# Patient Record
Sex: FEMALE | Race: WHITE | NOT HISPANIC OR LATINO | Employment: FULL TIME | URBAN - METROPOLITAN AREA
[De-identification: names, ages, dates, MRNs, and addresses within clinical notes are randomized per-mention and may not be internally consistent; named-entity substitution may affect disease eponyms.]

---

## 2021-10-03 RX ORDER — PRENATAL WITH FERROUS FUM AND FOLIC ACID 3080; 920; 120; 400; 22; 1.84; 3; 20; 10; 1; 12; 200; 27; 25; 2 [IU]/1; [IU]/1; MG/1; [IU]/1; MG/1; MG/1; MG/1; MG/1; MG/1; MG/1; UG/1; MG/1; MG/1; MG/1; MG/1
TABLET ORAL
COMMUNITY

## 2021-10-03 RX ORDER — LEVONORGESTREL 52 MG/1
INTRAUTERINE DEVICE INTRAUTERINE
COMMUNITY

## 2021-10-03 RX ORDER — DULOXETIN HYDROCHLORIDE 30 MG/1
CAPSULE, DELAYED RELEASE ORAL
COMMUNITY

## 2022-11-30 ENCOUNTER — OFFICE VISIT (OUTPATIENT)
Dept: PAIN MEDICINE | Facility: CLINIC | Age: 37
End: 2022-11-30
Payer: COMMERCIAL

## 2022-11-30 VITALS
WEIGHT: 181.19 LBS | SYSTOLIC BLOOD PRESSURE: 126 MMHG | HEIGHT: 65 IN | BODY MASS INDEX: 30.19 KG/M2 | DIASTOLIC BLOOD PRESSURE: 81 MMHG | HEART RATE: 111 BPM

## 2022-11-30 DIAGNOSIS — M54.16 LUMBAR RADICULOPATHY: Primary | ICD-10-CM

## 2022-11-30 DIAGNOSIS — M47.816 LUMBAR SPONDYLOSIS: ICD-10-CM

## 2022-11-30 DIAGNOSIS — M51.36 DDD (DEGENERATIVE DISC DISEASE), LUMBAR: ICD-10-CM

## 2022-11-30 PROCEDURE — 3079F DIAST BP 80-89 MM HG: CPT | Mod: CPTII,S$GLB,, | Performed by: ANESTHESIOLOGY

## 2022-11-30 PROCEDURE — 99203 OFFICE O/P NEW LOW 30 MIN: CPT | Mod: S$GLB,,, | Performed by: ANESTHESIOLOGY

## 2022-11-30 PROCEDURE — 3074F PR MOST RECENT SYSTOLIC BLOOD PRESSURE < 130 MM HG: ICD-10-PCS | Mod: CPTII,S$GLB,, | Performed by: ANESTHESIOLOGY

## 2022-11-30 PROCEDURE — 3079F PR MOST RECENT DIASTOLIC BLOOD PRESSURE 80-89 MM HG: ICD-10-PCS | Mod: CPTII,S$GLB,, | Performed by: ANESTHESIOLOGY

## 2022-11-30 PROCEDURE — 99999 PR PBB SHADOW E&M-EST. PATIENT-LVL IV: ICD-10-PCS | Mod: PBBFAC,,, | Performed by: ANESTHESIOLOGY

## 2022-11-30 PROCEDURE — 3008F BODY MASS INDEX DOCD: CPT | Mod: CPTII,S$GLB,, | Performed by: ANESTHESIOLOGY

## 2022-11-30 PROCEDURE — 99203 PR OFFICE/OUTPT VISIT, NEW, LEVL III, 30-44 MIN: ICD-10-PCS | Mod: S$GLB,,, | Performed by: ANESTHESIOLOGY

## 2022-11-30 PROCEDURE — 3008F PR BODY MASS INDEX (BMI) DOCUMENTED: ICD-10-PCS | Mod: CPTII,S$GLB,, | Performed by: ANESTHESIOLOGY

## 2022-11-30 PROCEDURE — 3074F SYST BP LT 130 MM HG: CPT | Mod: CPTII,S$GLB,, | Performed by: ANESTHESIOLOGY

## 2022-11-30 PROCEDURE — 99999 PR PBB SHADOW E&M-EST. PATIENT-LVL IV: CPT | Mod: PBBFAC,,, | Performed by: ANESTHESIOLOGY

## 2022-11-30 PROCEDURE — 1159F MED LIST DOCD IN RCRD: CPT | Mod: CPTII,S$GLB,, | Performed by: ANESTHESIOLOGY

## 2022-11-30 PROCEDURE — 1159F PR MEDICATION LIST DOCUMENTED IN MEDICAL RECORD: ICD-10-PCS | Mod: CPTII,S$GLB,, | Performed by: ANESTHESIOLOGY

## 2022-11-30 RX ORDER — NABUMETONE 750 MG/1
750 TABLET, FILM COATED ORAL 2 TIMES DAILY PRN
Qty: 60 TABLET | Refills: 1 | Status: SHIPPED | OUTPATIENT
Start: 2022-11-30 | End: 2023-01-12 | Stop reason: SDUPTHER

## 2022-11-30 NOTE — H&P (VIEW-ONLY)
New Patient Interventional Pain Note (Initial Visit)    Referring Physician: Seth Alfonso    PCP: Wily Schrader MD    Chief Complaint:   Chief Complaint   Patient presents with    Low-back Pain        SUBJECTIVE:    Michelle Montalvo is a 37 y.o. female who presents to the clinic for the evaluation of back pain.   Patient reports 2 year history of lower back pain.  Patient denies any specific inciting trauma  Pain described as an aching grinding pain on the right side of her lower back.  Patient reports occasional radiation down her right lower extremity on the posterior aspect to heal.  Pain is worse with running or with lying on her right side, better with rest and heat.  Pain is rated a 2/10 but can increase to a 8/10 with activity.    Non-Pharmacologic Treatments:  Physical Therapy/Home Exercise: yes  Ice/Heat:yes  TENS: no  Acupuncture: no  Massage: yes  Chiropractic: no        Previous Pain Medications:  NSAIDs, Tylenol, topicals, muscle relaxers     report:  Reviewed and consistent with medication use as prescribed.    Pain Procedures:   None      Imaging:   MRI SACRUM WO CONTRAST, MRI LUMBAR SPINE WO CONTRAST, 7/29/2022 12:00 PM     COMPARISON: None     HISTORY: recurrent pain at the SI joint and sacroiliac notch (right) M53.3:Sacrococcygeal disorders, not elsewhere classified (accession [id number]), M53.3:Sacrococcygeal disorders, not elsewhere classified (accession [id number]) M54.50:Low back pain, unspecified (accession [id number]), M54.50:Low back pain, unspecified (accession [id number])     TECHNIQUE: Multiplanar, multisequence MR imaging of the lumbar spine and sacrum was obtained without IV contrast.     FINDINGS:   LUMBAR SPINE:   Marrow signal and alignment are normal.  Vertebral body heights are maintained. Conus terminates normally at L1-2. Lumbar nerve roots have a normal appearance.     L1-2: No significant canal or foraminal stenosis.     L2-3:  No significant canal or foraminal  stenosis.     L3-4: Small diffuse disc bulge minimally narrowing the central canal and left neural foramen.     L4-5: Small disc bulge and facet hypertrophic changes mildly narrowing the central canal and neural foramen bilaterally. No nerve root contact seen.     L5-S1: No significant canal or foraminal stenosis.     SACRUM:   Marrow signal is normal. SI joints are normal. No fluid in the SI joints. No bone marrow edema. Sacral nerve roots appear normal.     Visualized bowel is unremarkable. No pathologically enlarged lymph nodes in the pelvis. No free fluid in the pelvis.    Past Medical History:   Diagnosis Date    Anemia     Anxiety     Dysmenorrhea     Endometriosis     Hypoglycemia     Insulin resistance     Pituitary adenoma     Premature labor      Past Surgical History:   Procedure Laterality Date    AUGMENTATION OF BREAST      Bilateral Salpingectomy  11/26/2018    dysmenorrhea & pelvic pain    Biopsy Of Vulva Or Perineum       CHOLECYSTECTOMY      DIAGNOSTIC LAPAROSCOPY      Excision of benign skin lesion      LAV  11/26/2018    dysmenorrhea & pelvic pain    REDUCTION OF BOTH BREASTS      Vulvar biopsy       Social History     Socioeconomic History    Marital status:    Tobacco Use    Smoking status: Never    Smokeless tobacco: Never   Substance and Sexual Activity    Alcohol use: Yes    Drug use: Never    Sexual activity: Yes     Partners: Male     Birth control/protection: See Surgical Hx     Family History   Problem Relation Age of Onset    Diabetes Mellitus Mother     Diabetes Mellitus Paternal Grandmother     Heart disease Paternal Grandmother     Diabetes Mellitus Paternal Grandfather     Diabetes Mellitus Maternal Grandfather     Heart disease Maternal Grandfather     Hypertension Father     Prostate cancer Neg Hx     Pancreatic cancer Neg Hx     Lung cancer Neg Hx        Review of patient's allergies indicates:   Allergen Reactions    Diflucan [fluconazole]     Procardia [nifedipine]         Current Outpatient Medications   Medication Sig    methylPREDNISolone (MEDROL DOSEPACK) 4 mg tablet Take by mouth.    nystatin (MYCOSTATIN) cream Apply topically 2 (two) times daily.    vortioxetine hydrobromide (TRINTELLIX ORAL)     amoxicillin-clavulanate 875-125mg (AUGMENTIN) 875-125 mg per tablet Take 1 tablet by mouth 2 (two) times daily.    azithromycin (Z-ANJELICA) 250 MG tablet Take by mouth.    cefdinir (OMNICEF) 300 MG capsule Take 300 mg by mouth 2 (two) times daily.    citalopram hydrobromide (CELEXA ORAL)     DULoxetine (CYMBALTA) 30 MG capsule Cymbalta 30 mg oral capsule,delayed release(DR/EC) take 1 capsule (30 mg) by oral route 2 times per day   Suspended    empagliflozin (JARDIANCE ORAL)     empagliflozin (JARDIANCE) 10 mg tablet Take by mouth.    escitalopram oxalate (LEXAPRO ORAL)     fluticasone propionate (FLONASE) 50 mcg/actuation nasal spray 2 sprays by Each Nostril route once daily.    levonorgestreL (MIRENA) 20 mcg/24 hours (6 yrs) 52 mg IUD     montelukast (SINGULAIR) 10 mg tablet Take 10 mg by mouth nightly.    nabumetone (RELAFEN) 750 MG tablet Take 1 tablet (750 mg total) by mouth 2 (two) times daily as needed for Pain.    phenazopyridine (PYRIDIUM) 200 MG tablet Take 200 mg by mouth 3 (three) times daily as needed.    phentermine (ADIPEX-P) 37.5 mg tablet Take 37.5 mg by mouth every morning.    PNV,calcium 72/iron/folic acid (PRENATAL VITAMIN) Tab Prenatal Vitamin oral tablet take 1 tablet by oral route once daily for 30 days   Suspended    predniSONE (DELTASONE) 20 MG tablet Take 20 mg by mouth once daily.    triamterene-hydrochlorothiazide 37.5-25 mg (MAXZIDE-25) 37.5-25 mg per tablet Take 0.5 tablets by mouth once daily.    VIIBRYD 20 mg Tab Take by mouth.    VIIBRYD 40 mg Tab tablet Take 40 mg by mouth once daily.     No current facility-administered medications for this visit.         ROS  Review of Systems   Constitutional:  Negative for chills, diaphoresis, fatigue and fever.  "  HENT:  Negative for ear discharge, ear pain, rhinorrhea, trouble swallowing and voice change.    Eyes:  Negative for pain and redness.   Respiratory:  Negative for chest tightness, shortness of breath, wheezing and stridor.    Cardiovascular:  Negative for chest pain and leg swelling.   Gastrointestinal:  Negative for blood in stool, diarrhea, nausea and vomiting.   Endocrine: Negative for cold intolerance and heat intolerance.   Genitourinary:  Negative for dysuria, hematuria and urgency.   Musculoskeletal:  Positive for arthralgias, back pain and myalgias. Negative for gait problem, joint swelling, neck pain and neck stiffness.   Skin:  Negative for rash.   Neurological:  Positive for numbness. Negative for tremors, seizures, speech difficulty, weakness, light-headedness and headaches.   Hematological:  Does not bruise/bleed easily.   Psychiatric/Behavioral:  Negative for agitation, confusion and suicidal ideas. The patient is not nervous/anxious.           OBJECTIVE:  /81   Pulse (!) 111   Ht 5' 5" (1.651 m)   Wt 82.2 kg (181 lb 3.5 oz)   BMI 30.16 kg/m²         Physical Exam  Constitutional:       General: She is not in acute distress.     Appearance: Normal appearance. She is not ill-appearing.   HENT:      Head: Normocephalic and atraumatic.      Nose: No congestion or rhinorrhea.   Eyes:      Extraocular Movements: Extraocular movements intact.      Pupils: Pupils are equal, round, and reactive to light.   Cardiovascular:      Pulses: Normal pulses.   Pulmonary:      Effort: Pulmonary effort is normal.   Abdominal:      General: Abdomen is flat.      Palpations: Abdomen is soft.   Musculoskeletal:      Cervical back: Normal range of motion and neck supple.   Skin:     General: Skin is warm and dry.      Capillary Refill: Capillary refill takes less than 2 seconds.   Neurological:      General: No focal deficit present.      Mental Status: She is alert and oriented to person, place, and time.      " Cranial Nerves: No cranial nerve deficit.      Sensory: No sensory deficit.      Motor: No weakness or abnormal muscle tone.      Gait: Gait normal.      Deep Tendon Reflexes:      Reflex Scores:       Patellar reflexes are 2+ on the right side and 2+ on the left side.       Achilles reflexes are 2+ on the right side and 2+ on the left side.  Psychiatric:         Mood and Affect: Mood normal.         Behavior: Behavior normal.         Thought Content: Thought content normal.         Musculoskeletal:        Lumbar Exam  Incision: no  Pain with Flexion: no  Pain with Extension: yes  ROM: decreased secondary to pain  Paraspinous TTP:  Right lumbar paraspinous  Facet TTP:  L4-L5  Facet Loading:  Positive on the right  SLR:  Positive on the right at 70°  SIJ TTP:  Positive on the right  DOMONIQUE:  Negative bilaterally      LABS:  No results found for: WBC, HGB, HCT, MCV, PLT    CMP  No results found for: NA, K, CL, CO2, GLU, BUN, CREATININE, CALCIUM, PROT, ALBUMIN, BILITOT, ALKPHOS, AST, ALT, ANIONGAP, ESTGFRAFRICA, EGFRNONAA    No results found for: LABA1C, HGBA1C          ASSESSMENT:       37 y.o. year old female with lower back pain, consistent with     1. Lumbar radiculopathy  IR PETER Lumbar w/ Img    Case Request-RAD/Other Procedure Area: Lumbar L4/L5 IL PETER    nabumetone (RELAFEN) 750 MG tablet      2. DDD (degenerative disc disease), lumbar        3. Lumbar spondylosis          Lumbar radiculopathy  -     IR PETER Lumbar w/ Img; Future; Expected date: 11/30/2022  -     Case Request-RAD/Other Procedure Area: Lumbar L4/L5 IL PETER  -     nabumetone (RELAFEN) 750 MG tablet; Take 1 tablet (750 mg total) by mouth 2 (two) times daily as needed for Pain.  Dispense: 60 tablet; Refill: 1    DDD (degenerative disc disease), lumbar    Lumbar spondylosis             PLAN:   - Interventions:   Schedule patient for L4-5 interlaminar epidural steroid injection for lumbar radiculopathy  - Anticoagulation use:   no no anticoagulation    -  Medications:   Start Relafen 750 mg twice a day as needed    - Therapy:    Patient has completed formal physical therapy with moderate relief.  Continue home exercises    - Imaging/Diagnostic:   MRI lumbar spine reviewed and findings discussed with patient.  Significant for facet arthropathy with foraminal stenosis at L4-5    - Consults:   None at this time        - Patient Questions: Answered all of the patient's questions regarding diagnosis, therapy, and treatment    - Follow up visit: return to clinic 4 weeks after procedure        The above plan and management options were discussed at length with patient. Patient is in agreement with the above and verbalized understanding.    I discussed the goals of interventional chronic pain management with the patient on today's visit.  I explained the utility of injections for diagnostic and therapeutic purposes.  We discussed a multimodal approach to pain including treating the patient's given worst pain at any given time.  We will use a systematic approach to addressing pain.  We will also adopt a multimodal approach that includes injections, adjuvant medications, physical therapy, at times psychiatry.  There may be a limited role for opioid use intermittently in the treatment of pain, more particularly for acute pain although no one approach can be used as a sole treatment modality.    I emphasized the importance of regular exercise, core strengthening and stretching, diet and weight loss as a cornerstone of long-term pain management.      Dustin Lugo MD  Interventional Pain Management  [facility]    Disclaimer:  This note was prepared using voice recognition system and is likely to have sound alike errors that may have been overlooked even after proof reading.  Please call me with any questions

## 2022-11-30 NOTE — PROGRESS NOTES
New Patient Interventional Pain Note (Initial Visit)    Referring Physician: Seth Alfonso    PCP: Wily Schrader MD    Chief Complaint:   Chief Complaint   Patient presents with    Low-back Pain        SUBJECTIVE:    Michelle Montalvo is a 37 y.o. female who presents to the clinic for the evaluation of back pain.   Patient reports 2 year history of lower back pain.  Patient denies any specific inciting trauma  Pain described as an aching grinding pain on the right side of her lower back.  Patient reports occasional radiation down her right lower extremity on the posterior aspect to heal.  Pain is worse with running or with lying on her right side, better with rest and heat.  Pain is rated a 2/10 but can increase to a 8/10 with activity.    Non-Pharmacologic Treatments:  Physical Therapy/Home Exercise: yes  Ice/Heat:yes  TENS: no  Acupuncture: no  Massage: yes  Chiropractic: no        Previous Pain Medications:  NSAIDs, Tylenol, topicals, muscle relaxers     report:  Reviewed and consistent with medication use as prescribed.    Pain Procedures:   None      Imaging:   MRI SACRUM WO CONTRAST, MRI LUMBAR SPINE WO CONTRAST, 7/29/2022 12:00 PM     COMPARISON: None     HISTORY: recurrent pain at the SI joint and sacroiliac notch (right) M53.3:Sacrococcygeal disorders, not elsewhere classified (accession [id number]), M53.3:Sacrococcygeal disorders, not elsewhere classified (accession [id number]) M54.50:Low back pain, unspecified (accession [id number]), M54.50:Low back pain, unspecified (accession [id number])     TECHNIQUE: Multiplanar, multisequence MR imaging of the lumbar spine and sacrum was obtained without IV contrast.     FINDINGS:   LUMBAR SPINE:   Marrow signal and alignment are normal.  Vertebral body heights are maintained. Conus terminates normally at L1-2. Lumbar nerve roots have a normal appearance.     L1-2: No significant canal or foraminal stenosis.     L2-3:  No significant canal or foraminal  stenosis.     L3-4: Small diffuse disc bulge minimally narrowing the central canal and left neural foramen.     L4-5: Small disc bulge and facet hypertrophic changes mildly narrowing the central canal and neural foramen bilaterally. No nerve root contact seen.     L5-S1: No significant canal or foraminal stenosis.     SACRUM:   Marrow signal is normal. SI joints are normal. No fluid in the SI joints. No bone marrow edema. Sacral nerve roots appear normal.     Visualized bowel is unremarkable. No pathologically enlarged lymph nodes in the pelvis. No free fluid in the pelvis.    Past Medical History:   Diagnosis Date    Anemia     Anxiety     Dysmenorrhea     Endometriosis     Hypoglycemia     Insulin resistance     Pituitary adenoma     Premature labor      Past Surgical History:   Procedure Laterality Date    AUGMENTATION OF BREAST      Bilateral Salpingectomy  11/26/2018    dysmenorrhea & pelvic pain    Biopsy Of Vulva Or Perineum       CHOLECYSTECTOMY      DIAGNOSTIC LAPAROSCOPY      Excision of benign skin lesion      LAV  11/26/2018    dysmenorrhea & pelvic pain    REDUCTION OF BOTH BREASTS      Vulvar biopsy       Social History     Socioeconomic History    Marital status:    Tobacco Use    Smoking status: Never    Smokeless tobacco: Never   Substance and Sexual Activity    Alcohol use: Yes    Drug use: Never    Sexual activity: Yes     Partners: Male     Birth control/protection: See Surgical Hx     Family History   Problem Relation Age of Onset    Diabetes Mellitus Mother     Diabetes Mellitus Paternal Grandmother     Heart disease Paternal Grandmother     Diabetes Mellitus Paternal Grandfather     Diabetes Mellitus Maternal Grandfather     Heart disease Maternal Grandfather     Hypertension Father     Prostate cancer Neg Hx     Pancreatic cancer Neg Hx     Lung cancer Neg Hx        Review of patient's allergies indicates:   Allergen Reactions    Diflucan [fluconazole]     Procardia [nifedipine]         Current Outpatient Medications   Medication Sig    methylPREDNISolone (MEDROL DOSEPACK) 4 mg tablet Take by mouth.    nystatin (MYCOSTATIN) cream Apply topically 2 (two) times daily.    vortioxetine hydrobromide (TRINTELLIX ORAL)     amoxicillin-clavulanate 875-125mg (AUGMENTIN) 875-125 mg per tablet Take 1 tablet by mouth 2 (two) times daily.    azithromycin (Z-ANJELICA) 250 MG tablet Take by mouth.    cefdinir (OMNICEF) 300 MG capsule Take 300 mg by mouth 2 (two) times daily.    citalopram hydrobromide (CELEXA ORAL)     DULoxetine (CYMBALTA) 30 MG capsule Cymbalta 30 mg oral capsule,delayed release(DR/EC) take 1 capsule (30 mg) by oral route 2 times per day   Suspended    empagliflozin (JARDIANCE ORAL)     empagliflozin (JARDIANCE) 10 mg tablet Take by mouth.    escitalopram oxalate (LEXAPRO ORAL)     fluticasone propionate (FLONASE) 50 mcg/actuation nasal spray 2 sprays by Each Nostril route once daily.    levonorgestreL (MIRENA) 20 mcg/24 hours (6 yrs) 52 mg IUD     montelukast (SINGULAIR) 10 mg tablet Take 10 mg by mouth nightly.    nabumetone (RELAFEN) 750 MG tablet Take 1 tablet (750 mg total) by mouth 2 (two) times daily as needed for Pain.    phenazopyridine (PYRIDIUM) 200 MG tablet Take 200 mg by mouth 3 (three) times daily as needed.    phentermine (ADIPEX-P) 37.5 mg tablet Take 37.5 mg by mouth every morning.    PNV,calcium 72/iron/folic acid (PRENATAL VITAMIN) Tab Prenatal Vitamin oral tablet take 1 tablet by oral route once daily for 30 days   Suspended    predniSONE (DELTASONE) 20 MG tablet Take 20 mg by mouth once daily.    triamterene-hydrochlorothiazide 37.5-25 mg (MAXZIDE-25) 37.5-25 mg per tablet Take 0.5 tablets by mouth once daily.    VIIBRYD 20 mg Tab Take by mouth.    VIIBRYD 40 mg Tab tablet Take 40 mg by mouth once daily.     No current facility-administered medications for this visit.         ROS  Review of Systems   Constitutional:  Negative for chills, diaphoresis, fatigue and fever.  "  HENT:  Negative for ear discharge, ear pain, rhinorrhea, trouble swallowing and voice change.    Eyes:  Negative for pain and redness.   Respiratory:  Negative for chest tightness, shortness of breath, wheezing and stridor.    Cardiovascular:  Negative for chest pain and leg swelling.   Gastrointestinal:  Negative for blood in stool, diarrhea, nausea and vomiting.   Endocrine: Negative for cold intolerance and heat intolerance.   Genitourinary:  Negative for dysuria, hematuria and urgency.   Musculoskeletal:  Positive for arthralgias, back pain and myalgias. Negative for gait problem, joint swelling, neck pain and neck stiffness.   Skin:  Negative for rash.   Neurological:  Positive for numbness. Negative for tremors, seizures, speech difficulty, weakness, light-headedness and headaches.   Hematological:  Does not bruise/bleed easily.   Psychiatric/Behavioral:  Negative for agitation, confusion and suicidal ideas. The patient is not nervous/anxious.           OBJECTIVE:  /81   Pulse (!) 111   Ht 5' 5" (1.651 m)   Wt 82.2 kg (181 lb 3.5 oz)   BMI 30.16 kg/m²         Physical Exam  Constitutional:       General: She is not in acute distress.     Appearance: Normal appearance. She is not ill-appearing.   HENT:      Head: Normocephalic and atraumatic.      Nose: No congestion or rhinorrhea.   Eyes:      Extraocular Movements: Extraocular movements intact.      Pupils: Pupils are equal, round, and reactive to light.   Cardiovascular:      Pulses: Normal pulses.   Pulmonary:      Effort: Pulmonary effort is normal.   Abdominal:      General: Abdomen is flat.      Palpations: Abdomen is soft.   Musculoskeletal:      Cervical back: Normal range of motion and neck supple.   Skin:     General: Skin is warm and dry.      Capillary Refill: Capillary refill takes less than 2 seconds.   Neurological:      General: No focal deficit present.      Mental Status: She is alert and oriented to person, place, and time.      " Cranial Nerves: No cranial nerve deficit.      Sensory: No sensory deficit.      Motor: No weakness or abnormal muscle tone.      Gait: Gait normal.      Deep Tendon Reflexes:      Reflex Scores:       Patellar reflexes are 2+ on the right side and 2+ on the left side.       Achilles reflexes are 2+ on the right side and 2+ on the left side.  Psychiatric:         Mood and Affect: Mood normal.         Behavior: Behavior normal.         Thought Content: Thought content normal.         Musculoskeletal:        Lumbar Exam  Incision: no  Pain with Flexion: no  Pain with Extension: yes  ROM: decreased secondary to pain  Paraspinous TTP:  Right lumbar paraspinous  Facet TTP:  L4-L5  Facet Loading:  Positive on the right  SLR:  Positive on the right at 70°  SIJ TTP:  Positive on the right  DOMONIQUE:  Negative bilaterally      LABS:  No results found for: WBC, HGB, HCT, MCV, PLT    CMP  No results found for: NA, K, CL, CO2, GLU, BUN, CREATININE, CALCIUM, PROT, ALBUMIN, BILITOT, ALKPHOS, AST, ALT, ANIONGAP, ESTGFRAFRICA, EGFRNONAA    No results found for: LABA1C, HGBA1C          ASSESSMENT:       37 y.o. year old female with lower back pain, consistent with     1. Lumbar radiculopathy  IR PETER Lumbar w/ Img    Case Request-RAD/Other Procedure Area: Lumbar L4/L5 IL PETER    nabumetone (RELAFEN) 750 MG tablet      2. DDD (degenerative disc disease), lumbar        3. Lumbar spondylosis          Lumbar radiculopathy  -     IR PETER Lumbar w/ Img; Future; Expected date: 11/30/2022  -     Case Request-RAD/Other Procedure Area: Lumbar L4/L5 IL PETER  -     nabumetone (RELAFEN) 750 MG tablet; Take 1 tablet (750 mg total) by mouth 2 (two) times daily as needed for Pain.  Dispense: 60 tablet; Refill: 1    DDD (degenerative disc disease), lumbar    Lumbar spondylosis             PLAN:   - Interventions:   Schedule patient for L4-5 interlaminar epidural steroid injection for lumbar radiculopathy  - Anticoagulation use:   no no anticoagulation    -  Medications:   Start Relafen 750 mg twice a day as needed    - Therapy:    Patient has completed formal physical therapy with moderate relief.  Continue home exercises    - Imaging/Diagnostic:   MRI lumbar spine reviewed and findings discussed with patient.  Significant for facet arthropathy with foraminal stenosis at L4-5    - Consults:   None at this time        - Patient Questions: Answered all of the patient's questions regarding diagnosis, therapy, and treatment    - Follow up visit: return to clinic 4 weeks after procedure        The above plan and management options were discussed at length with patient. Patient is in agreement with the above and verbalized understanding.    I discussed the goals of interventional chronic pain management with the patient on today's visit.  I explained the utility of injections for diagnostic and therapeutic purposes.  We discussed a multimodal approach to pain including treating the patient's given worst pain at any given time.  We will use a systematic approach to addressing pain.  We will also adopt a multimodal approach that includes injections, adjuvant medications, physical therapy, at times psychiatry.  There may be a limited role for opioid use intermittently in the treatment of pain, more particularly for acute pain although no one approach can be used as a sole treatment modality.    I emphasized the importance of regular exercise, core strengthening and stretching, diet and weight loss as a cornerstone of long-term pain management.      Dustin Lugo MD  Interventional Pain Management  [facility]    Disclaimer:  This note was prepared using voice recognition system and is likely to have sound alike errors that may have been overlooked even after proof reading.  Please call me with any questions

## 2022-12-05 NOTE — PRE-PROCEDURE INSTRUCTIONS
Spoke with patient regarding procedure scheduled on 12.12    Arrival time 0715    Has patient been sick with fever or on antibiotics within the last 7 days? No    Does the patient have any open wounds, sores or rashes? No    Does the patient have any recent fractures? no    Has patient received a vaccination within the last 7 days? No    Received the COVID vaccination?     Has the patient stopped all medications as directed? Hold jardiance am of procedure    Does patient have a pacemaker and or defibrillator? no    Does the patient have a ride to and from procedure and someone reliable to remain with patient?     Is the patient diabetic? Insulin resistant     Does the patient have sleep apnea? Or use O2 at home? No and no     Is the patient receiving sedation? yes    Is the patient instructed to remain NPO beginning at midnight the night before their procedure? yes    Procedure location confirmed with patient? Yes    Covid- Denies signs/symptoms. Instructed to notify PAT/MD if any changes.

## 2022-12-12 ENCOUNTER — HOSPITAL ENCOUNTER (OUTPATIENT)
Facility: HOSPITAL | Age: 37
Discharge: HOME | End: 2022-12-12
Attending: ANESTHESIOLOGY | Admitting: ANESTHESIOLOGY
Payer: COMMERCIAL

## 2022-12-12 VITALS
RESPIRATION RATE: 18 BRPM | WEIGHT: 179 LBS | DIASTOLIC BLOOD PRESSURE: 64 MMHG | BODY MASS INDEX: 29.82 KG/M2 | TEMPERATURE: 98 F | HEIGHT: 65 IN | HEART RATE: 81 BPM | SYSTOLIC BLOOD PRESSURE: 111 MMHG | OXYGEN SATURATION: 99 %

## 2022-12-12 DIAGNOSIS — M54.16 LUMBAR RADICULOPATHY: ICD-10-CM

## 2022-12-12 PROCEDURE — 62323 NJX INTERLAMINAR LMBR/SAC: CPT | Mod: ,,, | Performed by: ANESTHESIOLOGY

## 2022-12-12 PROCEDURE — 63600175 PHARM REV CODE 636 W HCPCS: Performed by: ANESTHESIOLOGY

## 2022-12-12 PROCEDURE — 62323 PR INJ LUMBAR/SACRAL, W/IMAGING GUIDANCE: ICD-10-PCS | Mod: ,,, | Performed by: ANESTHESIOLOGY

## 2022-12-12 PROCEDURE — 25500020 PHARM REV CODE 255: Performed by: ANESTHESIOLOGY

## 2022-12-12 PROCEDURE — 25000003 PHARM REV CODE 250: Performed by: ANESTHESIOLOGY

## 2022-12-12 PROCEDURE — 62323 NJX INTERLAMINAR LMBR/SAC: CPT | Performed by: ANESTHESIOLOGY

## 2022-12-12 RX ORDER — FENTANYL CITRATE 50 UG/ML
INJECTION, SOLUTION INTRAMUSCULAR; INTRAVENOUS
Status: DISCONTINUED | OUTPATIENT
Start: 2022-12-12 | End: 2022-12-12 | Stop reason: HOSPADM

## 2022-12-12 RX ORDER — BETAMETHASONE SODIUM PHOSPHATE AND BETAMETHASONE ACETATE 3; 3 MG/ML; MG/ML
INJECTION, SUSPENSION INTRA-ARTICULAR; INTRALESIONAL; INTRAMUSCULAR; SOFT TISSUE
Status: DISCONTINUED | OUTPATIENT
Start: 2022-12-12 | End: 2022-12-12 | Stop reason: HOSPADM

## 2022-12-12 RX ORDER — ONDANSETRON 2 MG/ML
4 INJECTION INTRAMUSCULAR; INTRAVENOUS ONCE AS NEEDED
Status: ACTIVE | OUTPATIENT
Start: 2022-12-12 | End: 2034-05-09

## 2022-12-12 RX ORDER — LIDOCAINE HYDROCHLORIDE 10 MG/ML
INJECTION, SOLUTION EPIDURAL; INFILTRATION; INTRACAUDAL; PERINEURAL
Status: DISCONTINUED | OUTPATIENT
Start: 2022-12-12 | End: 2022-12-12 | Stop reason: HOSPADM

## 2022-12-12 RX ORDER — MIDAZOLAM HYDROCHLORIDE 1 MG/ML
INJECTION, SOLUTION INTRAMUSCULAR; INTRAVENOUS
Status: DISCONTINUED | OUTPATIENT
Start: 2022-12-12 | End: 2022-12-12 | Stop reason: HOSPADM

## 2022-12-12 NOTE — DISCHARGE INSTRUCTIONS

## 2022-12-12 NOTE — OP NOTE
Lumbar Interlaminar Epidural Steroid Injection under Fluoroscopic Guidance.     INFORMED CONSENT: The procedure, risks, benefits and options were discussed with patient. There are no contraindications to the procedure. The patient expressed understanding and agreed to proceed. The personnel performing the procedure was discussed.    Date of procedure 12/12/2022    Time-out taken to identify patient and procedure side prior to starting the procedure.                     PROCEDURE:   L4/5 Interlaminar Epidural Steroid Injection Under Fluoroscopic Guidance.     Pre-Op diagnosis: Lumbar radiculopathy [M54.16]    Post-Op diagnosis: Lumbar radiculopathy [M54.16]    PHYSICIAN: Dustin Lugo MD    ASSISTANTS: None     ESTIMATED BLOOD LOSS: none.     COMPLICATIONS: none.     SPECIMENS: none    Sedation: Conscious sedation provided by M.D    SEDATION MEDICATIONS: local/IV sedation: Versed 2 mg and fentanyl 1090 mcg IV.  Conscious sedation ordered by MD.  Patient reevaluated and sedation administered by MD and monitored by RN.  Total sedation time was less than 10 min.      TECHNIQUE: With the patient laying in a prone position, the area was prepped and draped in the usual sterile fashion using ChloraPrep and a fenestrated drape. 1% lidocaine was given using a 27-gauge needle by raising a wheal and going down to the hub of the needle over the L4/5 interlaminar space.  The interlaminar space was then approached with a 3.5 inch 18-gauge Touhy needle was introduced under fluoroscopic guidance in the AP and Lateral view. Once the Ligamentum flavum was encountered loss of resistance to saline was used to enter the epidural space. With positive loss of resistance and negative CSF or Blood, 3mL contrast dye Omnipaque (300mg/ml) was injected to confirm placement and there was no vascular runoff. 2ml of Betamethasone PF 6mg/ml and 3ml of Lidocaine PF 1%. Displacement of the radiopaque contrast after injection of the medication  confirmed that the medication went into the area of the epidural space.  The patient tolerated the procedure well.       The patient was monitored for approximately 30 minutes after the procedure.  Patient was given post procedure and discharge instructions to follow at home.  The patient was discharged in a stable condition

## 2022-12-12 NOTE — DISCHARGE SUMMARY
Discharge Note  Short Stay      SUMMARY     Admit Date: 12/12/2022    Attending Physician: Dustin Luog MD        Discharge Physician: Dustin Lugo MD        Discharge Date: 12/12/2022 8:21 AM    Procedure(s) (LRB):  Lumbar L4/L5 IL PETER (N/A)    Final Diagnosis: Lumbar radiculopathy [M54.16]    Disposition: Home or self care    Patient Instructions:   Current Discharge Medication List        CONTINUE these medications which have NOT CHANGED    Details   !! VIIBRYD 40 mg Tab tablet Take 40 mg by mouth once daily.      amoxicillin-clavulanate 875-125mg (AUGMENTIN) 875-125 mg per tablet Take 1 tablet by mouth 2 (two) times daily.      azithromycin (Z-ANJELICA) 250 MG tablet Take by mouth.      cefdinir (OMNICEF) 300 MG capsule Take 300 mg by mouth 2 (two) times daily.      citalopram hydrobromide (CELEXA ORAL)       DULoxetine (CYMBALTA) 30 MG capsule Cymbalta 30 mg oral capsule,delayed release(DR/EC) take 1 capsule (30 mg) by oral route 2 times per day   Suspended      !! empagliflozin (JARDIANCE ORAL)       !! empagliflozin (JARDIANCE) 10 mg tablet Take by mouth.      escitalopram oxalate (LEXAPRO ORAL)       fluticasone propionate (FLONASE) 50 mcg/actuation nasal spray 2 sprays by Each Nostril route once daily.      levonorgestreL (MIRENA) 20 mcg/24 hours (6 yrs) 52 mg IUD       methylPREDNISolone (MEDROL DOSEPACK) 4 mg tablet Take by mouth.      montelukast (SINGULAIR) 10 mg tablet Take 10 mg by mouth nightly.      nabumetone (RELAFEN) 750 MG tablet Take 1 tablet (750 mg total) by mouth 2 (two) times daily as needed for Pain.  Qty: 60 tablet, Refills: 1    Associated Diagnoses: Lumbar radiculopathy      nystatin (MYCOSTATIN) cream Apply topically 2 (two) times daily.      phenazopyridine (PYRIDIUM) 200 MG tablet Take 200 mg by mouth 3 (three) times daily as needed.      phentermine (ADIPEX-P) 37.5 mg tablet Take 37.5 mg by mouth every morning.      PNV,calcium 72/iron/folic acid (PRENATAL VITAMIN) Tab Prenatal  Vitamin oral tablet take 1 tablet by oral route once daily for 30 days   Suspended      predniSONE (DELTASONE) 20 MG tablet Take 20 mg by mouth once daily.      triamterene-hydrochlorothiazide 37.5-25 mg (MAXZIDE-25) 37.5-25 mg per tablet Take 0.5 tablets by mouth once daily.      !! VIIBRYD 20 mg Tab Take by mouth.      vortioxetine hydrobromide (TRINTELLIX ORAL)        !! - Potential duplicate medications found. Please discuss with provider.              Discharge Diagnosis: Lumbar radiculopathy [M54.16]  Condition on Discharge: Stable with no complications to procedure   Diet on Discharge: Same as before.  Activity: as per instruction sheet.  Discharge to: Home with a responsible adult.  Follow up: 2-4 weeks       Please call the office at [phone] if you experience any weakness or loss of sensation, fever > 101.5, pain uncontrolled with oral medications, persistent nausea/vomiting/or diarrhea, redness or drainage from the incisions, or any other worrisome concerns. If physician on call was not reached or could not communicate with our office for any reason please go to the nearest emergency department

## 2023-01-12 ENCOUNTER — OFFICE VISIT (OUTPATIENT)
Dept: PAIN MEDICINE | Facility: CLINIC | Age: 38
End: 2023-01-12
Payer: COMMERCIAL

## 2023-01-12 VITALS
BODY MASS INDEX: 29.75 KG/M2 | HEIGHT: 65 IN | DIASTOLIC BLOOD PRESSURE: 79 MMHG | WEIGHT: 178.56 LBS | HEART RATE: 85 BPM | SYSTOLIC BLOOD PRESSURE: 107 MMHG

## 2023-01-12 DIAGNOSIS — M54.16 LUMBAR RADICULOPATHY: ICD-10-CM

## 2023-01-12 DIAGNOSIS — M47.816 LUMBAR SPONDYLOSIS: Primary | ICD-10-CM

## 2023-01-12 DIAGNOSIS — M51.36 DDD (DEGENERATIVE DISC DISEASE), LUMBAR: ICD-10-CM

## 2023-01-12 PROCEDURE — 1159F PR MEDICATION LIST DOCUMENTED IN MEDICAL RECORD: ICD-10-PCS | Mod: CPTII,S$GLB,, | Performed by: ANESTHESIOLOGY

## 2023-01-12 PROCEDURE — 3008F PR BODY MASS INDEX (BMI) DOCUMENTED: ICD-10-PCS | Mod: CPTII,S$GLB,, | Performed by: ANESTHESIOLOGY

## 2023-01-12 PROCEDURE — 3078F PR MOST RECENT DIASTOLIC BLOOD PRESSURE < 80 MM HG: ICD-10-PCS | Mod: CPTII,S$GLB,, | Performed by: ANESTHESIOLOGY

## 2023-01-12 PROCEDURE — 99213 OFFICE O/P EST LOW 20 MIN: CPT | Mod: S$GLB,,, | Performed by: ANESTHESIOLOGY

## 2023-01-12 PROCEDURE — 3008F BODY MASS INDEX DOCD: CPT | Mod: CPTII,S$GLB,, | Performed by: ANESTHESIOLOGY

## 2023-01-12 PROCEDURE — 3074F PR MOST RECENT SYSTOLIC BLOOD PRESSURE < 130 MM HG: ICD-10-PCS | Mod: CPTII,S$GLB,, | Performed by: ANESTHESIOLOGY

## 2023-01-12 PROCEDURE — 99213 PR OFFICE/OUTPT VISIT, EST, LEVL III, 20-29 MIN: ICD-10-PCS | Mod: S$GLB,,, | Performed by: ANESTHESIOLOGY

## 2023-01-12 PROCEDURE — 99999 PR PBB SHADOW E&M-EST. PATIENT-LVL IV: CPT | Mod: PBBFAC,,, | Performed by: ANESTHESIOLOGY

## 2023-01-12 PROCEDURE — 1159F MED LIST DOCD IN RCRD: CPT | Mod: CPTII,S$GLB,, | Performed by: ANESTHESIOLOGY

## 2023-01-12 PROCEDURE — 99999 PR PBB SHADOW E&M-EST. PATIENT-LVL IV: ICD-10-PCS | Mod: PBBFAC,,, | Performed by: ANESTHESIOLOGY

## 2023-01-12 PROCEDURE — 3074F SYST BP LT 130 MM HG: CPT | Mod: CPTII,S$GLB,, | Performed by: ANESTHESIOLOGY

## 2023-01-12 PROCEDURE — 3078F DIAST BP <80 MM HG: CPT | Mod: CPTII,S$GLB,, | Performed by: ANESTHESIOLOGY

## 2023-01-12 RX ORDER — NABUMETONE 750 MG/1
750 TABLET, FILM COATED ORAL 2 TIMES DAILY PRN
Qty: 60 TABLET | Refills: 1 | Status: SHIPPED | OUTPATIENT
Start: 2023-01-12

## 2023-01-12 NOTE — PROGRESS NOTES
Interventional Pain Progress Note       Referring Physician: No ref. provider found    PCP: Wily Schrader MD    Chief Complaint:   Chief Complaint   Patient presents with    Low-back Pain       Interval History (01/12/2023):  Patient returns to clinic after procedure.  Patient reports 80% relief and lower back pain after L4-5 interlaminar epidural steroid injection on 12/12/2022 patient reports resolution of right lower extremity pain after injection as well.  Primary pain today is aching throbbing pain in a band across her lower back with associated crepitus.  Pain is worse with extension and prolonged standing, better with flexion and heat.  Pain is rated 2/10, but can increase to a 7/10 with activity. Denies any fevers, chills, changes in gait, weakness, or bowel and bladder incontinence         SUBJECTIVE:    Michelle Montalvo is a 37 y.o. female who presents to the clinic for the evaluation of back pain.   Patient reports 2 year history of lower back pain.  Patient denies any specific inciting trauma  Pain described as an aching grinding pain on the right side of her lower back.  Patient reports occasional radiation down her right lower extremity on the posterior aspect to heal.  Pain is worse with running or with lying on her right side, better with rest and heat.  Pain is rated a 2/10 but can increase to a 8/10 with activity.    Non-Pharmacologic Treatments:  Physical Therapy/Home Exercise: yes  Ice/Heat:yes  TENS: no  Acupuncture: no  Massage: yes  Chiropractic: no        Previous Pain Medications:  NSAIDs, Tylenol, topicals, muscle relaxers     report:  Reviewed and consistent with medication use as prescribed.    Pain Procedures:   -12/12/2022: L4-5 interlaminar epidural steroid injection, 80% relief with resolution of right lower extremity pain      Imaging:   MRI SACRUM WO CONTRAST, MRI LUMBAR SPINE WO CONTRAST, 7/29/2022 12:00 PM     COMPARISON: None     HISTORY: recurrent pain at the SI joint and  sacroiliac notch (right) M53.3:Sacrococcygeal disorders, not elsewhere classified (accession [id number]), M53.3:Sacrococcygeal disorders, not elsewhere classified (accession [id number]) M54.50:Low back pain, unspecified (accession [id number]), M54.50:Low back pain, unspecified (accession [id number])     TECHNIQUE: Multiplanar, multisequence MR imaging of the lumbar spine and sacrum was obtained without IV contrast.     FINDINGS:   LUMBAR SPINE:   Marrow signal and alignment are normal.  Vertebral body heights are maintained. Conus terminates normally at L1-2. Lumbar nerve roots have a normal appearance.     L1-2: No significant canal or foraminal stenosis.     L2-3:  No significant canal or foraminal stenosis.     L3-4: Small diffuse disc bulge minimally narrowing the central canal and left neural foramen.     L4-5: Small disc bulge and facet hypertrophic changes mildly narrowing the central canal and neural foramen bilaterally. No nerve root contact seen.     L5-S1: No significant canal or foraminal stenosis.     SACRUM:   Marrow signal is normal. SI joints are normal. No fluid in the SI joints. No bone marrow edema. Sacral nerve roots appear normal.     Visualized bowel is unremarkable. No pathologically enlarged lymph nodes in the pelvis. No free fluid in the pelvis.    Past Medical History:   Diagnosis Date    Anemia     Anxiety     Dysmenorrhea     Endometriosis     Hypoglycemia     Insulin resistance     Pituitary adenoma     Premature labor      Past Surgical History:   Procedure Laterality Date    AUGMENTATION OF BREAST      Bilateral Salpingectomy  11/26/2018    dysmenorrhea & pelvic pain    Biopsy Of Vulva Or Perineum       CHOLECYSTECTOMY      DIAGNOSTIC LAPAROSCOPY      EPIDURAL STEROID INJECTION N/A 12/12/2022    Procedure: Lumbar L4/L5 IL PETER;  Surgeon: Dustin Lugo MD;  Location: [facility];  Service: Pain Management;  Laterality: N/A;    Excision of benign skin lesion      [facility]  11/26/2018     dysmenorrhea & pelvic pain    REDUCTION OF BOTH BREASTS      Vulvar biopsy       Social History     Socioeconomic History    Marital status:    Tobacco Use    Smoking status: Never    Smokeless tobacco: Never   Substance and Sexual Activity    Alcohol use: Yes    Drug use: Never    Sexual activity: Yes     Partners: Male     Birth control/protection: See Surgical Hx     Family History   Problem Relation Age of Onset    Diabetes Mellitus Mother     Diabetes Mellitus Paternal Grandmother     Heart disease Paternal Grandmother     Diabetes Mellitus Paternal Grandfather     Diabetes Mellitus Maternal Grandfather     Heart disease Maternal Grandfather     Hypertension Father     Prostate cancer Neg Hx     Pancreatic cancer Neg Hx     Lung cancer Neg Hx        Review of patient's allergies indicates:   Allergen Reactions    Diflucan [fluconazole]     Procardia [nifedipine]        Current Outpatient Medications   Medication Sig    amoxicillin-clavulanate 875-125mg (AUGMENTIN) 875-125 mg per tablet Take 1 tablet by mouth 2 (two) times daily.    azithromycin (Z-ANJELICA) 250 MG tablet Take by mouth.    cefdinir (OMNICEF) 300 MG capsule Take 300 mg by mouth 2 (two) times daily.    citalopram hydrobromide (CELEXA ORAL)     DULoxetine (CYMBALTA) 30 MG capsule Cymbalta 30 mg oral capsule,delayed release(DR/EC) take 1 capsule (30 mg) by oral route 2 times per day   Suspended    empagliflozin (JARDIANCE ORAL)     empagliflozin (JARDIANCE) 10 mg tablet Take by mouth.    escitalopram oxalate (LEXAPRO ORAL)     fluticasone propionate (FLONASE) 50 mcg/actuation nasal spray 2 sprays by Each Nostril route once daily.    levonorgestreL (MIRENA) 20 mcg/24 hours (6 yrs) 52 mg IUD     methylPREDNISolone (MEDROL DOSEPACK) 4 mg tablet Take by mouth.    montelukast (SINGULAIR) 10 mg tablet Take 10 mg by mouth nightly.    nystatin (MYCOSTATIN) cream Apply topically 2 (two) times daily.    phenazopyridine (PYRIDIUM) 200 MG tablet Take 200 mg  "by mouth 3 (three) times daily as needed.    phentermine (ADIPEX-P) 37.5 mg tablet Take 37.5 mg by mouth every morning.    PNV,calcium 72/iron/folic acid (PRENATAL VITAMIN) Tab Prenatal Vitamin oral tablet take 1 tablet by oral route once daily for 30 days   Suspended    predniSONE (DELTASONE) 20 MG tablet Take 20 mg by mouth once daily.    triamterene-hydrochlorothiazide 37.5-25 mg (MAXZIDE-25) 37.5-25 mg per tablet Take 0.5 tablets by mouth once daily.    VIIBRYD 20 mg Tab Take by mouth.    VIIBRYD 40 mg Tab tablet Take 40 mg by mouth once daily.    vortioxetine hydrobromide (TRINTELLIX ORAL)     nabumetone (RELAFEN) 750 MG tablet Take 1 tablet (750 mg total) by mouth 2 (two) times daily as needed for Pain.     No current facility-administered medications for this visit.     Facility-Administered Medications Ordered in Other Visits   Medication    ondansetron injection 4 mg         ROS  Review of Systems   Constitutional:  Negative for chills, diaphoresis, fatigue and fever.   HENT:  Negative for ear discharge and ear pain.    Respiratory:  Negative for chest tightness, shortness of breath, wheezing and stridor.    Cardiovascular:  Negative for chest pain and leg swelling.   Gastrointestinal:  Negative for blood in stool, diarrhea, nausea and vomiting.   Endocrine: Negative for cold intolerance and heat intolerance.   Genitourinary:  Negative for dysuria, hematuria and urgency.   Musculoskeletal:  Positive for arthralgias, back pain and myalgias. Negative for gait problem, joint swelling, neck pain and neck stiffness.   Skin:  Negative for rash.   Neurological:  Positive for numbness. Negative for tremors, seizures, speech difficulty, weakness, light-headedness and headaches.   Hematological:  Does not bruise/bleed easily.   Psychiatric/Behavioral:  Negative for agitation, confusion and suicidal ideas. The patient is not nervous/anxious.           OBJECTIVE:  /79   Pulse 85   Ht 5' 5" (1.651 m)   Wt 81 kg " (178 lb 9.2 oz)   BMI 29.72 kg/m²         Physical Exam  Constitutional:       General: She is not in acute distress.     Appearance: Normal appearance. She is not ill-appearing.   HENT:      Head: Normocephalic and atraumatic.      Nose: No congestion or rhinorrhea.   Eyes:      Extraocular Movements: Extraocular movements intact.      Pupils: Pupils are equal, round, and reactive to light.   Cardiovascular:      Pulses: Normal pulses.   Pulmonary:      Effort: Pulmonary effort is normal.   Abdominal:      General: Abdomen is flat.      Palpations: Abdomen is soft.   Musculoskeletal:      Cervical back: Normal range of motion and neck supple.   Skin:     General: Skin is warm and dry.      Capillary Refill: Capillary refill takes less than 2 seconds.   Neurological:      General: No focal deficit present.      Mental Status: She is alert and oriented to person, place, and time.      Cranial Nerves: No cranial nerve deficit.      Sensory: No sensory deficit.      Motor: No weakness or abnormal muscle tone.      Gait: Gait normal.      Deep Tendon Reflexes:      Reflex Scores:       Patellar reflexes are 2+ on the right side and 2+ on the left side.       Achilles reflexes are 2+ on the right side and 2+ on the left side.  Psychiatric:         Mood and Affect: Mood normal.         Behavior: Behavior normal.         Thought Content: Thought content normal.         Musculoskeletal:        Lumbar Exam  Incision: no  Pain with Flexion: no  Pain with Extension: yes  ROM:  Improved compared to prior exam  Paraspinous TTP:  Bilateral lumbar paraspinous  Facet TTP:  L4-L5  Facet Loading:  Positive bilaterally  SLR:  Negative bilaterally  DOMONIQUE:  Negative bilaterally        ASSESSMENT:       37 y.o. year old female with lower back pain, consistent with     1. Lumbar spondylosis        2. Lumbar radiculopathy  nabumetone (RELAFEN) 750 MG tablet      3. DDD (degenerative disc disease), lumbar          Lumbar  spondylosis    Lumbar radiculopathy  -     nabumetone (RELAFEN) 750 MG tablet; Take 1 tablet (750 mg total) by mouth 2 (two) times daily as needed for Pain.  Dispense: 60 tablet; Refill: 1    DDD (degenerative disc disease), lumbar             PLAN:   - Interventions:    Discuss possible bilateral L4-5 and L5-S1 facet injections for lumbar spondylosis.  Patient defers at this time  -12/12/2022: L4-5 interlaminar epidural steroid injection, 80% relief with resolution of right lower extremity pain  - Anticoagulation use:   no no anticoagulation    - Medications:   Refill Relafen 750 mg twice a day as needed    - Therapy:    Patient has completed formal physical therapy with moderate relief.  Continue home exercises    - Imaging/Diagnostic:   MRI lumbar spine reviewed.  Significant for facet arthropathy with foraminal stenosis at L4-5    - Consults:   None at this time        - Patient Questions: Answered all of the patient's questions regarding diagnosis, therapy, and treatment    - Follow up visit: return to clinic p.r.n.        The above plan and management options were discussed at length with patient. Patient is in agreement with the above and verbalized understanding.    I discussed the goals of interventional chronic pain management with the patient on today's visit.  I explained the utility of injections for diagnostic and therapeutic purposes.  We discussed a multimodal approach to pain including treating the patient's given worst pain at any given time.  We will use a systematic approach to addressing pain.  We will also adopt a multimodal approach that includes injections, adjuvant medications, physical therapy, at times psychiatry.  There may be a limited role for opioid use intermittently in the treatment of pain, more particularly for acute pain although no one approach can be used as a sole treatment modality.    I emphasized the importance of regular exercise, core strengthening and stretching, diet and  weight loss as a cornerstone of long-term pain management.      Dustin Lugo MD  Interventional Pain Management  [facility]    Disclaimer:  This note was prepared using voice recognition system and is likely to have sound alike errors that may have been overlooked even after proof reading.  Please call me with any questions